# Patient Record
Sex: FEMALE | Race: WHITE
[De-identification: names, ages, dates, MRNs, and addresses within clinical notes are randomized per-mention and may not be internally consistent; named-entity substitution may affect disease eponyms.]

---

## 2019-02-12 NOTE — HISTORY AND PHYSICAL
DATE OF SERVICE:  



PREOPERATIVE HISTORY AND PHYSICAL



ADMISSION IS PLANNED FOR

2019.



HISTORY OF PRESENT ILLNESS:

The patient is a 38-year-old G2, P2, LC2 white female with a long history of

dysfunctional uterine bleeding.  She has tried oral contraceptives without

significant improvement in her bleeding.  She has progressively heavier periods

and progressively increasing cramps.  She was found to have endometrial

hyperplasia and polycystic ovaries on ultrasound on 2019.  She is known to

have fibroids contained in her uterus from prior ultrasounds.  That was

confirmed on this most recent ultrasound as well.  The patient has a history

that is consistent with endometriosis having had two  deliveries in the

past.  Ultrasound is significant for having multiple fibroids and/or adenomyosis

appearance on the ultrasound.  The endometrial stripe was 14 mm.  The patient

denies urinary incontinence.  She has no significant issues with her bowel or

bladder.  She is unable to have intercourse because of the pelvic pain and

pressure.  She has elected for definitive surgical treatment in the form of

total laparoscopic hysterectomy with bilateral salpingo-oophorectomy.  She is

fully aware of the risks, potential complications, recovery and followup and in

particular the need for a long-term hormone replacement to replace the estrogen

after a BSO.  She is currently taking Sprintec and will continue that after her

surgery.



ALLERGIES:

None.



MEDICATIONS:

Zoloft 50 mg daily, daily vitamin and Sprintec 1 p.o. daily.



Past medical, social and surgical histories are per a prior H and P that is

included.



PAST SURGICAL HISTORY:

two previous C-sections.



PHYSICAL EXAMINATION:

HEENT:  Normal.

NECK:  Supple, no lymphadenopathy and no thyromegaly.

ABDOMEN:  Soft, nontender and nondistended.

EXTREMITIES:  Show no clubbing or cyanosis.  There is no Homans sign.

PELVIC:  Reveals suprapubic tenderness on bimanual exam as well as uterus that

is enlarged, globular and tender on palpation.  Adnexal exam is consistent with

bilateral ovarian tenderness and ovarian fullness as well.  The uterus on exam

is somewhat fixed in place.



LABORATORY DATA:

Lab work prior to admission will be obtained on admission.



ASSESSMENT AND PLAN:

Chronic pelvic pain with dysfunctional uterine bleeding in a patient with

apparent endometrial fibroids with some degree of endometrial hyperplasia based

on a 14 mm endometrial stripe and with progressive symptoms that have not

responded to control with oral contraceptives.  Plan now is for admission on

2019 for a definitive surgical treatment in the form of hysterectomy with

bilateral salpingo-oophorectomy.



Surgical risks, complication, recovery and followup have been fully discussed. 

The patient accepts those risks and is ready to proceed with the surgery on that

date.





Job ID: [id number]

DocumentID: [id number]

Dictated Date:  2019 13:09:45

Transcription Date: 2019 13:30:22

Dictated By: SHAWNA LINK MD

MTDD

## 2019-03-01 ENCOUNTER — HOSPITAL ENCOUNTER (OUTPATIENT)
Dept: HOSPITAL 75 - PREOP | Age: 39
Discharge: HOME | End: 2019-03-01
Attending: OBSTETRICS & GYNECOLOGY
Payer: COMMERCIAL

## 2019-03-01 VITALS — WEIGHT: 161 LBS | HEIGHT: 60 IN | BODY MASS INDEX: 31.61 KG/M2

## 2019-03-01 DIAGNOSIS — Z01.818: Primary | ICD-10-CM

## 2019-03-04 ENCOUNTER — HOSPITAL ENCOUNTER (OUTPATIENT)
Dept: HOSPITAL 75 - SDC | Age: 39
LOS: 1 days | Discharge: HOME | End: 2019-03-05
Attending: OBSTETRICS & GYNECOLOGY
Payer: COMMERCIAL

## 2019-03-04 VITALS — HEIGHT: 60 IN | BODY MASS INDEX: 31.61 KG/M2 | WEIGHT: 161 LBS

## 2019-03-04 VITALS — SYSTOLIC BLOOD PRESSURE: 104 MMHG | DIASTOLIC BLOOD PRESSURE: 69 MMHG

## 2019-03-04 VITALS — SYSTOLIC BLOOD PRESSURE: 125 MMHG | DIASTOLIC BLOOD PRESSURE: 65 MMHG

## 2019-03-04 VITALS — SYSTOLIC BLOOD PRESSURE: 118 MMHG | DIASTOLIC BLOOD PRESSURE: 65 MMHG

## 2019-03-04 DIAGNOSIS — D25.0: Primary | ICD-10-CM

## 2019-03-04 DIAGNOSIS — N84.1: ICD-10-CM

## 2019-03-04 DIAGNOSIS — N80.3: ICD-10-CM

## 2019-03-04 DIAGNOSIS — N83.202: ICD-10-CM

## 2019-03-04 DIAGNOSIS — N80.1: ICD-10-CM

## 2019-03-04 DIAGNOSIS — Z11.2: ICD-10-CM

## 2019-03-04 DIAGNOSIS — Z79.899: ICD-10-CM

## 2019-03-04 DIAGNOSIS — N83.201: ICD-10-CM

## 2019-03-04 DIAGNOSIS — E28.2: ICD-10-CM

## 2019-03-04 DIAGNOSIS — N72: ICD-10-CM

## 2019-03-04 DIAGNOSIS — N83.8: ICD-10-CM

## 2019-03-04 LAB
BASOPHILS # BLD AUTO: 0 10^3/UL (ref 0–0.1)
BASOPHILS NFR BLD AUTO: 0 % (ref 0–10)
EOSINOPHIL # BLD AUTO: 0 10^3/UL (ref 0–0.3)
EOSINOPHIL NFR BLD AUTO: 0 % (ref 0–10)
ERYTHROCYTE [DISTWIDTH] IN BLOOD BY AUTOMATED COUNT: 13.8 % (ref 10–14.5)
HCT VFR BLD CALC: 31 % (ref 35–52)
HGB BLD-MCNC: 10.2 G/DL (ref 11.5–16)
LYMPHOCYTES # BLD AUTO: 1.7 X 10^3 (ref 1–4)
LYMPHOCYTES NFR BLD AUTO: 34 % (ref 12–44)
MANUAL DIFFERENTIAL PERFORMED BLD QL: NO
MCH RBC QN AUTO: 29 PG (ref 25–34)
MCHC RBC AUTO-ENTMCNC: 33 G/DL (ref 32–36)
MCV RBC AUTO: 89 FL (ref 80–99)
MONOCYTES # BLD AUTO: 0.3 X 10^3 (ref 0–1)
MONOCYTES NFR BLD AUTO: 7 % (ref 0–12)
NEUTROPHILS # BLD AUTO: 2.9 X 10^3 (ref 1.8–7.8)
NEUTROPHILS NFR BLD AUTO: 59 % (ref 42–75)
PLATELET # BLD: 226 10^3/UL (ref 130–400)
PMV BLD AUTO: 9.1 FL (ref 7.4–10.4)
WBC # BLD AUTO: 4.9 10^3/UL (ref 4.3–11)

## 2019-03-04 PROCEDURE — 87081 CULTURE SCREEN ONLY: CPT

## 2019-03-04 PROCEDURE — 86900 BLOOD TYPING SEROLOGIC ABO: CPT

## 2019-03-04 PROCEDURE — 84703 CHORIONIC GONADOTROPIN ASSAY: CPT

## 2019-03-04 PROCEDURE — 86850 RBC ANTIBODY SCREEN: CPT

## 2019-03-04 PROCEDURE — 86901 BLOOD TYPING SEROLOGIC RH(D): CPT

## 2019-03-04 PROCEDURE — 85025 COMPLETE CBC W/AUTO DIFF WBC: CPT

## 2019-03-04 PROCEDURE — 36415 COLL VENOUS BLD VENIPUNCTURE: CPT

## 2019-03-04 RX ADMIN — OXYCODONE HYDROCHLORIDE AND ACETAMINOPHEN PRN TAB: 5; 325 TABLET ORAL at 19:39

## 2019-03-04 RX ADMIN — KETOROLAC TROMETHAMINE SCH MG: 30 INJECTION, SOLUTION INTRAMUSCULAR; INTRAVENOUS at 22:12

## 2019-03-04 RX ADMIN — SODIUM CHLORIDE, SODIUM LACTATE, POTASSIUM CHLORIDE, CALCIUM CHLORIDE, AND DEXTROSE MONOHYDRATE SCH MLS/HR: 600; 310; 30; 20; 5 INJECTION, SOLUTION INTRAVENOUS at 16:00

## 2019-03-04 RX ADMIN — SODIUM CHLORIDE, SODIUM LACTATE, POTASSIUM CHLORIDE, AND CALCIUM CHLORIDE PRN MLS/HR: 600; 310; 30; 20 INJECTION, SOLUTION INTRAVENOUS at 13:23

## 2019-03-04 RX ADMIN — SODIUM CHLORIDE, SODIUM LACTATE, POTASSIUM CHLORIDE, AND CALCIUM CHLORIDE PRN MLS/HR: 600; 310; 30; 20 INJECTION, SOLUTION INTRAVENOUS at 12:25

## 2019-03-04 NOTE — PROGRESS NOTE-PRE OPERATIVE
Pre-Operative Progress Note


H&P Reviewed


The H&P was reviewed, patient examined and no changes noted.


Date Seen by Provider:  Mar 4, 2019


Time Seen by Provider:  12:41


Date H&P Reviewed:  Mar 4, 2019


Time H&P Reviewed:  12:41


Pre-Operative Diagnosis:  Uterine bleeding/menorrhagia/chronic pelvic pain/

endometriosis











SHAWNA LINK MD Mar 4, 2019 12:41

## 2019-03-04 NOTE — PROGRESS NOTE-PRE OPERATIVE
Pre-Operative Progress Note


H&P Reviewed


The H&P was reviewed, patient examined and no changes noted.


Date Seen by Provider:  Mar 4, 2019


Time Seen by Provider:  12:30


Date H&P Reviewed:  Mar 4, 2019


Time H&P Reviewed:  12:30


Pre-Operative Diagnosis:  Dysfunctional uterine bleeding/chronic pelvic pain/

menorrhagia/endometriosi











SHAWNA LINK MD Mar 4, 2019 08:03

## 2019-03-04 NOTE — NUR
pt to room 306 from recovery room.  pt sleepy but responds to verbal stimuli.  IV infusing 
per gravity. resp unlabored.  HRRR. abd soft with abdominal incisions intact with dressing 
on.  pt moves extremities to command.  SCD's on bilaterally.  butt cath to bedside 
drainage.  pt reports pressure "from the cath".   oriented to person place and time.

## 2019-03-04 NOTE — XMS REPORT
Clinical Summary

 Created on: 2019



Ana Cristina Ventura

External Reference #: [id number]

: 1980

Sex: Female



Demographics







 Address  [address] 

[place]  [address]

 

 Home Phone  [phone]

 

 Preferred Language  English

 

 Marital Status  Unknown

 

 Latter day Affiliation  CHR

 

 Race  White

 

 Ethnic Group  Not  or 





Author







 Author  [facility]

 

 Organization  [facility]

 

 Address  Unknown

 

 Phone  Unavailable







Support







 Name  Relationship  Address  Phone

 

 Ni Ventura  [employer]  [address]  [address]  [phone]







Care Team Providers







 Care Team Member Name  Role  Phone

 

 Julien Sterling  PCP  [phone]







Source Comments

Some departments are not documenting in the electronic medical record.  If you 
do not see the information that you expected, contact Release of Information in 
the Health Information Management department at [phone] for further 
assistance in locating additional records.[facility]



Allergies

Not on File



Medications

Not on file



Active Problems





Not on file



Social History







     Date



  Tobacco Use   Types   Packs/Day   Years Used 

 

      



  Never Assessed    









 



  Sex Assigned at Birth   Date Recorded

 

 



  Not on file 









   Industry



  Job Start Date   Occupation 

 

   Not on file



  Not on file   Not on file 









   Travel End



  Travel History   Travel Start 













  No recent travel history available.







Last Filed Vital Signs

Not on file



Plan of Treatment







   



  Health Maintenance   Due Date   Last Done   Comments

 

   



  PHYSICAL (COMPREHENSIVE)   1988  



  EXAM   

 

   



  HIV SCREENING   1996  

 

   



  DTAP/TDAP VACCINES ( -   1999  



  Tdap)   

 

   



  CERVICAL CANCER SCREENING   2011  

 

   



  INFLUENZA VACCINE   2019  







Results

Not on filefrom Last 3 Months

## 2019-03-04 NOTE — PROGRESS NOTE-POST OPERATIVE
Post-Operative Progess Note


Surgeon (s)/Assistant (s)


Surgeon


SHAWNA LINK MD


Assistant:  Renetta Yancey





Pre-Operative Diagnosis


Dysfunctional uterine bleeding/chronic pelvic pain/menorrhagia/endometriosi





Post-Operative Diagnosis





Same with recurrent endometriosis and with pathology pending





Procedure & Operative Findings


Date of Procedure


3/4/19


Procedure Performed/Findings


TLH with BSO


Anesthesia Type


GETA





Estimated Blood Loss


Estimated blood loss (mL):  50cc





Specimens/Packing


Specimens Removed


Uterus tubes and ovaries


Packing:  


None











SHAWNA LINK MD Mar 4, 2019 08:04

## 2019-03-04 NOTE — NUR
D5LR started at 125ml/hr/pump.  pt remains sleepy, denies need for pain medication.  rates 
pain level 5.  IV site patent.  family at bedside. abd soft with incisions intact.  v pad 
intact.  butt draining to bedside drainage. pt  moves extremities to command.

## 2019-03-04 NOTE — DISCHARGE INSTRUCTIONS
Discharge Instructions


Discharge Medications


New, Converted or Re-Newed RX:  RX on Chart





Patient Instructions


Patient Instructions:  


As directed


Return to The Hospital For:  


As directed





Activity & Diet


Discharge Diet:  No Restrictions


Activity as Tolerated:  No





Orders-Post D/C & Referrals





Follow Up Appt:


Return to clinic on Wednesday, March 6, 2019 at 930 a.m. for staple removal


Call to make follow up appt. for patient in 4 weeks.





Activity: 


Rest for 24 hours, than as tolerated. 





Wound Care:


May remove Band-Aid tomorrow. Replace as desired. Keep incisions clean and dry. 

Wash daily with soap and water.





Please call in RX to patient pharmacy.








Diet: 


As tolerated-Clear Liquids only if nauseated.





shower or tub bathe as desired.





No driving for 24 hours, no alcoholic beverages for 24 hours, and nothing per 

vagina (no tampons, douching, or intercoUrse) for 8 weeks.





Patient to return to the clinic as soon as possible for: Temperature greater 

than 101F, Severe Pain, Foul discharge from incision or vagina, Excessive 

Bleeding (more than a period).











SHAWNA LINK MD Mar 4, 2019 08:01

## 2019-03-04 NOTE — NUR
taking sips of water.  denies nausea.  remains sleepy.  denies need for pain medication.  
SCD's on bilaterally

## 2019-03-05 VITALS — DIASTOLIC BLOOD PRESSURE: 56 MMHG | SYSTOLIC BLOOD PRESSURE: 116 MMHG

## 2019-03-05 VITALS — DIASTOLIC BLOOD PRESSURE: 75 MMHG | SYSTOLIC BLOOD PRESSURE: 115 MMHG

## 2019-03-05 RX ADMIN — KETOROLAC TROMETHAMINE SCH MG: 30 INJECTION, SOLUTION INTRAMUSCULAR; INTRAVENOUS at 03:58

## 2019-03-05 RX ADMIN — OXYCODONE HYDROCHLORIDE AND ACETAMINOPHEN PRN TAB: 5; 325 TABLET ORAL at 09:00

## 2019-03-05 RX ADMIN — OXYCODONE HYDROCHLORIDE AND ACETAMINOPHEN PRN TAB: 5; 325 TABLET ORAL at 00:04

## 2019-03-05 RX ADMIN — SODIUM CHLORIDE, SODIUM LACTATE, POTASSIUM CHLORIDE, CALCIUM CHLORIDE, AND DEXTROSE MONOHYDRATE SCH MLS/HR: 600; 310; 30; 20; 5 INJECTION, SOLUTION INTRAVENOUS at 00:00

## 2019-03-05 NOTE — ANESTHESIA-GENERAL POST-OP
General


Patient Condition


Mental Status/LOC:  Same as Preop


Cardiovascular:  Satisfactory


Nausea/Vomiting:  Absent


Respiratory:  Satisfactory


Pain:  Controlled


Complications:  Absent





Post Op Complications


Complications


None





Follow Up Care/Instructions


Patient Instructions


None needed.





Anesthesia/Patient Condition


Patient Condition


Patient was seen this morning in post-op rounds and she was doing well, no 

complaints, stable vital signs, no apparent adverse anesthesia problems.











EDGAR FELDMAN DO Mar 5, 2019 13:56

## 2019-03-05 NOTE — PROGRESS NOTE-STANDARD
Standard Progress Note


Progress Notes/Assess & Plan


Date Seen by a Provider:  Mar 5, 2019


Time Seen by a Provider:  07:48


Progress/Assessment & Plan


This patient is without complaint.  She is ablating, voiding, tolerating oral 

intake well has good pain control.  Patient denies chest pain, denies shortness 

breath, denies nausea vomiting, denies headache.








Vital Signs








  Date Time  Temp Pulse Resp B/P (MAP) Pulse Ox O2 Delivery O2 Flow Rate FiO2


 


3/5/19 04:00 98.8 66 16 116/56 (76) 99 Room Air  


 


3/4/19 22:30 98.8 68 16 125/65 (85) 99 Room Air  


 


3/4/19 19:47     97 Room Air  


 


3/4/19 19:47 99.6 72 16 118/65 (82) 97 Room Air  


 


3/4/19 16:00 98.3 88 16  97   


 


3/4/19 16:00     97 Room Air  


 


3/4/19 14:53 98.9       


 


3/4/19 11:30 98.9 66 16 104/69 (81) 100 Room Air  














I & O 


 


 3/5/19





 07:00


 


Intake Total 2870 ml


 


Output Total 2350 ml


 


Balance 520 ml





Vital signs are stable.  Patient is afebrile.  Urine output is more than 

adequate.





The abdomen is benign.  The surgical incision dressings are clean and dry.


Extreme show clubbing or cyanosis.  There is no Homans sign.


Pelvic exam is deferred





Assessment and plan   postoperative day number 1 doing well.  Plan is for 

discharge home with follow-up in clinic


Final Diagnosis


Dysfunctional uterine bleeding/menorrhagia/chronic pelvic pain/endometriosis











SHAWNA LINK MD Mar 5, 2019 07:49

## 2019-03-05 NOTE — OPERATIVE REPORT
DATE OF SERVICE:  03/04/2019



PREOPERATIVE DIAGNOSES:

Dysfunctional uterine bleeding, menorrhagia, chronic pelvic pain, history of

endometriosis.



POSTOPERATIVE DIAGNOSES:

Dysfunctional uterine bleeding, menorrhagia, chronic pelvic pain, history of

endometriosis with pathology pending and with cervical polyp noted on exam.



OPERATIVE PROCEDURE:

Total laparoscopic hysterectomy, bilateral salpingo-oophorectomy as well as some

adhesiolysis and destruction of recurrent endometriosis implants.



OPERATIVE DESCRIPTION:

With the patient in the supine position under satisfactory general anesthesia,

she was repositioned in dorsal lithotomy position in the [facility] and

prepped and draped in the usual fashion for abdominal and vaginal surgery. 

Urinary bladder was drained via Bhakta catheter to dependent drainage.



Weighted speculum placed in posterior fornix of vagina, cervix exposed and

grasped anteriorly with single tooth tenaculum.  The uterus was sounded to 9 cm

with uterine sound.  The cervix was then serially dilated with Eliel dilators to

accommodate a Nidhi II manipulator, which was placed using a 6 mm x 8 cm uterine

probe and a 30 mm colpotomy ring.  Sutures of #1 Vicryl placed at 3 and 9

o'clock position of the cervix to affix the uterus to the manipulator.  The

tenaculum and speculum were removed.  The patient brought in low dorsal

lithotomy position.



A 12 mm incision was made 4 cm superior to the umbilicus.  Veress needle was

placed through that incision into the abdominal cavity.  Correct placement was

confirmed with water drop test.  The abdomen was insufflated with 2.4 liters of

carbon dioxide.  The Veress needle was removed and a 12 mm Optiview laparoscopic

port placed.  The towel clip was used and the anterior abdominal wall to assist

with elevation.



The abdominal wall was transilluminated.  An 8 mm ports were placed through

incisions of those sizes 9 cm lateral to the umbilicus, a couple of centimeters

superior to the umbilicus.  The patient had 2 previous laparoscopy incision

scars one on each side, the same area was used for this procedure.  All three

incision sites were infiltrated with 0.25% Marcaine with epinephrine prior to

making the incision.  The 8 mm ports were placed under direct vision.  The

patient placed in Trendelenburg allowing the bowel spilled out of the pelvis and

then the da Damien column was advanced on the patient and docked.  With the

operative instruments placed in the right and left lateral port site, I retired

to the da Damien console.



At the console using a vessel sealer on the right and a bipolar fenestrated

grasper on the left, the pelvis was first examined.  The uterus was mottled in

appearance consistent with extensive adenomyosis.  It was also misshapen

consistent with likely fibroids.  There was obvious endometriosis implants in

both ovarian fossa and in the cul-de-sac.  The both fallopian tubes were

somewhat tortuous and fibrotic.  Both ovaries had a hemosiderin deposits

consistent with old endometriosis well.  Both ureters were seemed to peristalse.

 There were some adhesions of sigmoid to the left pelvic brim obstructing access

to the IP ligament.  These were taken down with very careful limits, meticulous

dissection taking care to avoid thermal or traumatic injury to bowel vascular or

other adjacent structures.  Laparoscope was rotated.  The appendix was

surgically absent.  There were some staples in the cecum where the appendix had

been taken out some years ago.



Attention was turned back to the pelvis.  The right tube and ovary were grasped

and elevated.  The IP ligament was clamped, cauterized and divided with the

vessel sealing going across the mesovarium, across round ligament, across broad

ligament and down on to the cardinal ligament with the vessel sealer.  Same

procedure performed on the left, allowing for removal of both tubes and ovaries

eventually with the uterus.  The anterior lower uterine segment peritoneum was

then exposed and the vessel sealer replaced with monopolar shear.  The

peritoneum was opened on the lower uterine segment allowing dissection of the

vesicovaginal space and allowing the bladder to spill down or slide down be

dissected down off of the lower uterine segment and off of the anterior vaginal

wall at the cervicovaginal junction.  Colpotomy incision was then initiated 12

o'clock position onto the colpotomy ring.  That was extended circumferentially

until the colpotomy ring was exposed and then the uterus with the tubes and

ovaries still attached was extracted through the vagina.



Vaginal cuff now closed using two sutures of V-Loc barbed suture starting first

on the right angle and continuing almost the left angle and then continuing from

the left angle until the entire cuff was closed and using the balance of that

suture to reperitonealize the cuff.  Care was taken to include the uterine

vessel pedicles in the suture closure to ensure hemostasis.



The pelvis was now irrigated.  Both ureters were seemed to peristalse again as

it had throughout the procedure and before initiating the procedure.  The

endometrial implants were then destroyed using the monopolar shear on the right

and the bipolar fenestrated grasper on the left for exposure and manipulation. 

With all of the endometriosis implants destroyed with the ureter still

peristalsing and with no remaining pathology and no bleeding, the procedure was

terminated.



The operative instruments removed under direct vision as were the ports.  The

patient's abdomen was evacuated of insufflating gas in the process of removing

the ports.  The skin incisions were stapled after first closing the fascia at

the supraumbilical incision with a figure-of-eight suture of 2-0 Vicryl.



Speculum replaced in the vagina.  The vaginal cuff was examined and was found to

be completely intact and hemostatic.





Job ID: [id number]

DocumentID: [id number]

Dictated Date:  03/04/2019 14:14:08

Transcription Date: 03/05/2019 00:53:18

Dictated By: SHAWNA LINK MD

## 2019-03-05 NOTE — NUR
PT DISCHARGED FROM -306 TO PERSONAL AUTO VIA AMBULATORY IN STABLE CONDITION ACC BY THIS RN 
AND S/O.

## 2019-03-05 NOTE — NUR
DISCHARGE PAPERS PROVIDED AND REVIEWED WITH PT, PT VERBALIZES UNDERSTANDING AND DENIES ANY 
QUESTIONS AT THIS TIME. PAPER SIGNED.